# Patient Record
Sex: FEMALE | URBAN - NONMETROPOLITAN AREA
[De-identification: names, ages, dates, MRNs, and addresses within clinical notes are randomized per-mention and may not be internally consistent; named-entity substitution may affect disease eponyms.]

---

## 2020-06-18 ENCOUNTER — TELEPHONE ENCOUNTER (OUTPATIENT)
Dept: URBAN - NONMETROPOLITAN AREA CLINIC 2 | Facility: CLINIC | Age: 45
End: 2020-06-18

## 2020-06-18 RX ORDER — METOPROLOL TARTRATE 25 MG/1
TABLET, FILM COATED ORAL
Qty: 0 | Refills: 0 | Status: ACTIVE | COMMUNITY
Start: 1900-01-01 | End: 1900-01-01

## 2020-06-18 RX ORDER — COLESTIPOL HYDROCHLORIDE 1 G/1
TAKE 1 TABLET BY ORAL ROUTE DAILY TABLET ORAL 1
Qty: 90 | Refills: 3 | Status: ACTIVE | COMMUNITY
Start: 2020-02-24 | End: 2021-02-18

## 2020-06-18 RX ORDER — MONTELUKAST SODIUM 10 MG/1
TAKE 1 TABLET (10 MG) BY ORAL ROUTE ONCE DAILY IN THE EVENING TABLET, FILM COATED ORAL 1
Qty: 0 | Refills: 0 | Status: ACTIVE | COMMUNITY
Start: 1900-01-01 | End: 1900-01-01

## 2020-06-18 RX ORDER — ASPIRIN 325 MG/1
TABLET ORAL
Qty: 0 | Refills: 0 | Status: ACTIVE | COMMUNITY
Start: 1900-01-01 | End: 1900-01-01

## 2020-06-18 RX ORDER — HYOSCYAMINE SULFATE 0.12 MG/1
1 TABLET UNDER THE TONGUE AND ALLOW TO DISSOLVE  AS NEEDED TABLET SUBLINGUAL
Qty: 60 TABLET | Refills: 6 | OUTPATIENT
Start: 2020-06-18 | End: 2021-01-13

## 2020-06-18 RX ORDER — CETIRIZINE HYDROCHLORIDE 10 MG/1
CAPSULE, LIQUID FILLED ORAL
Qty: 0 | Refills: 0 | Status: ACTIVE | COMMUNITY
Start: 1900-01-01 | End: 1900-01-01

## 2020-06-18 RX ORDER — FAMOTIDINE 20 MG/1
1 TABLET AT BEDTIME AS NEEDED TABLET, FILM COATED ORAL TWICE A DAY
Qty: 180 TABLET | Refills: 3 | OUTPATIENT
Start: 2020-06-18

## 2020-06-18 RX ORDER — LEVOTHYROXINE SODIUM 50 UG/1
TABLET ORAL
Qty: 0 | Refills: 0 | Status: ACTIVE | COMMUNITY
Start: 1900-01-01 | End: 1900-01-01

## 2020-11-12 ENCOUNTER — OFFICE VISIT (OUTPATIENT)
Dept: URBAN - METROPOLITAN AREA TELEHEALTH 2 | Facility: TELEHEALTH | Age: 45
End: 2020-11-12
Payer: COMMERCIAL

## 2020-11-12 DIAGNOSIS — K58.0 IRRITABLE BOWEL SYNDROME WITH DIARRHEA: ICD-10-CM

## 2020-11-12 DIAGNOSIS — Z12.11 COLON CANCER SCREENING: ICD-10-CM

## 2020-11-12 DIAGNOSIS — A04.72 C. DIFFICILE DIARRHEA: ICD-10-CM

## 2020-11-12 PROCEDURE — 99213 OFFICE O/P EST LOW 20 MIN: CPT | Performed by: INTERNAL MEDICINE

## 2020-11-12 PROCEDURE — G8427 DOCREV CUR MEDS BY ELIG CLIN: HCPCS | Performed by: INTERNAL MEDICINE

## 2020-11-12 PROCEDURE — 1036F TOBACCO NON-USER: CPT | Performed by: INTERNAL MEDICINE

## 2020-11-12 PROCEDURE — G8417 CALC BMI ABV UP PARAM F/U: HCPCS | Performed by: INTERNAL MEDICINE

## 2020-11-12 PROCEDURE — G9903 PT SCRN TBCO ID AS NON USER: HCPCS | Performed by: INTERNAL MEDICINE

## 2020-11-12 RX ORDER — ASPIRIN 325 MG/1
TABLET ORAL
Qty: 0 | Refills: 0 | Status: ACTIVE | COMMUNITY
Start: 1900-01-01

## 2020-11-12 RX ORDER — FAMOTIDINE 20 MG/1
1 TABLET AT BEDTIME AS NEEDED TABLET, FILM COATED ORAL TWICE A DAY
Qty: 180 TABLET | Refills: 3 | Status: ACTIVE | COMMUNITY
Start: 2020-06-18

## 2020-11-12 RX ORDER — MONTELUKAST SODIUM 10 MG/1
TAKE 1 TABLET (10 MG) BY ORAL ROUTE ONCE DAILY IN THE EVENING TABLET, FILM COATED ORAL 1
Qty: 0 | Refills: 0 | Status: ACTIVE | COMMUNITY
Start: 1900-01-01

## 2020-11-12 RX ORDER — LEVOTHYROXINE SODIUM 50 UG/1
TABLET ORAL
Qty: 0 | Refills: 0 | Status: ACTIVE | COMMUNITY
Start: 1900-01-01

## 2020-11-12 RX ORDER — HYOSCYAMINE SULFATE 0.12 MG/1
1 TABLET UNDER THE TONGUE AND ALLOW TO DISSOLVE  AS NEEDED TABLET SUBLINGUAL
Qty: 60 TABLET | Refills: 6 | Status: ACTIVE | COMMUNITY
Start: 2020-06-18 | End: 2021-01-13

## 2020-11-12 RX ORDER — CETIRIZINE HYDROCHLORIDE 10 MG/1
CAPSULE, LIQUID FILLED ORAL
Qty: 0 | Refills: 0 | Status: ACTIVE | COMMUNITY
Start: 1900-01-01

## 2020-11-12 RX ORDER — COLESTIPOL HYDROCHLORIDE 1 G/1
TAKE 1 TABLET BY ORAL ROUTE DAILY TABLET ORAL 1
Qty: 90 | Refills: 3 | Status: ACTIVE | COMMUNITY
Start: 2020-02-24 | End: 2021-02-18

## 2020-11-12 RX ORDER — METOPROLOL TARTRATE 25 MG/1
TABLET, FILM COATED ORAL
Qty: 0 | Refills: 0 | Status: ACTIVE | COMMUNITY
Start: 1900-01-01

## 2020-11-12 NOTE — HPI-TODAY'S VISIT:
2/24/2020 Suzanne presents for evaluation of diarrhea and c. diff. She developed diarrhea new years day. Saw her PCP that week with stools that were negative and treated with cipro x 7 days. She developed recurrent diarrhea after her cipro treatment and tested positive for c.diff. She completed 2 weeks of vanc. She is still having loose/urgent stools 2-3 times a day but no mucous or foul smell. She is on florastor daily. She denies fever or chills. 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 Since her last visit her labs are normal. Her colonoscopy was normal. She states the colestipol helps significantly but causes constipation daily. She does find it hard to take as needed with the medication interaction. She would like to have someting on hand for bad days. SHe notices dairy is a big trigger to her diarrhea. Today she is doing well otherwise. MB  11/12/2020 Suzanne presents for follow up of IBS D. She is doing great on florastor BID and levsin or immodium as needed for triggers. She thinks the colestipol TIW seems to help. She is doing well otherwise. MB

## 2021-04-14 ENCOUNTER — ERX REFILL RESPONSE (OUTPATIENT)
Dept: URBAN - NONMETROPOLITAN AREA CLINIC 2 | Facility: CLINIC | Age: 46
End: 2021-04-14

## 2021-04-14 RX ORDER — FAMOTIDINE 20 MG/1
1 TABLET AT BEDTIME AS NEEDED TABLET ORAL TWICE A DAY
Qty: 180 | Refills: 3

## 2021-08-13 NOTE — PHYSICAL EXAM GASTROINTESTINAL
Abdomen Self Exam, soft, nontender, nondistended , no masses palpable FAMILY HISTORY:  Family history of heart attack    Sibling  Still living? Unknown  Family history of cancer, Age at diagnosis: Age Unknown  Family history of systemic lupus erythematosus, Age at diagnosis: Age Unknown

## 2022-12-26 ENCOUNTER — OFFICE VISIT (OUTPATIENT)
Dept: URBAN - NONMETROPOLITAN AREA CLINIC 2 | Facility: CLINIC | Age: 47
End: 2022-12-26

## 2023-01-04 ENCOUNTER — WEB ENCOUNTER (OUTPATIENT)
Dept: URBAN - NONMETROPOLITAN AREA CLINIC 2 | Facility: CLINIC | Age: 48
End: 2023-01-04

## 2023-01-06 ENCOUNTER — OFFICE VISIT (OUTPATIENT)
Dept: URBAN - NONMETROPOLITAN AREA CLINIC 2 | Facility: CLINIC | Age: 48
End: 2023-01-06
Payer: COMMERCIAL

## 2023-01-06 ENCOUNTER — LAB OUTSIDE AN ENCOUNTER (OUTPATIENT)
Dept: URBAN - NONMETROPOLITAN AREA CLINIC 2 | Facility: CLINIC | Age: 48
End: 2023-01-06

## 2023-01-06 VITALS
DIASTOLIC BLOOD PRESSURE: 86 MMHG | SYSTOLIC BLOOD PRESSURE: 133 MMHG | BODY MASS INDEX: 41.15 KG/M2 | WEIGHT: 247 LBS | HEIGHT: 65 IN | HEART RATE: 60 BPM

## 2023-01-06 DIAGNOSIS — A04.72 C. DIFFICILE DIARRHEA: ICD-10-CM

## 2023-01-06 DIAGNOSIS — K58.0 IRRITABLE BOWEL SYNDROME WITH DIARRHEA: ICD-10-CM

## 2023-01-06 DIAGNOSIS — K21.9 GASTROESOPHAGEAL REFLUX DISEASE, UNSPECIFIED WHETHER ESOPHAGITIS PRESENT: ICD-10-CM

## 2023-01-06 DIAGNOSIS — Z12.11 COLON CANCER SCREENING: ICD-10-CM

## 2023-01-06 PROBLEM — 275978004 COLON CANCER SCREENING: Status: ACTIVE | Noted: 2020-11-12

## 2023-01-06 PROCEDURE — 99214 OFFICE O/P EST MOD 30 MIN: CPT | Performed by: NURSE PRACTITIONER

## 2023-01-06 RX ORDER — LEVOTHYROXINE SODIUM 50 UG/1
TABLET ORAL
Qty: 0 | Refills: 0 | Status: ACTIVE | COMMUNITY
Start: 1900-01-01

## 2023-01-06 RX ORDER — METOPROLOL TARTRATE 25 MG/1
TABLET, FILM COATED ORAL
Qty: 0 | Refills: 0 | Status: ACTIVE | COMMUNITY
Start: 1900-01-01

## 2023-01-06 RX ORDER — ASPIRIN 325 MG/1
TABLET ORAL
Qty: 0 | Refills: 0 | Status: ACTIVE | COMMUNITY
Start: 1900-01-01

## 2023-01-06 RX ORDER — CETIRIZINE HYDROCHLORIDE 10 MG/1
CAPSULE, LIQUID FILLED ORAL
Qty: 0 | Refills: 0 | Status: ACTIVE | COMMUNITY
Start: 1900-01-01

## 2023-01-06 RX ORDER — OMEPRAZOLE 40 MG/1
1 CAPSULE 30 MINUTES BEFORE MORNING MEAL CAPSULE, DELAYED RELEASE ORAL ONCE A DAY
Status: ACTIVE | COMMUNITY

## 2023-01-06 RX ORDER — MONTELUKAST SODIUM 10 MG/1
TAKE 1 TABLET (10 MG) BY ORAL ROUTE ONCE DAILY IN THE EVENING TABLET, FILM COATED ORAL 1
Qty: 0 | Refills: 0 | Status: ACTIVE | COMMUNITY
Start: 1900-01-01

## 2023-01-06 RX ORDER — LISINOPRIL 10 MG/1
1 TABLET TABLET ORAL ONCE A DAY
Status: ACTIVE | COMMUNITY

## 2023-01-06 NOTE — HPI-TODAY'S VISIT:
2/24/2020 Suzanne presents for evaluation of diarrhea and c. diff. She developed diarrhea new years day. Saw her PCP that week with stools that were negative and treated with cipro x 7 days. She developed recurrent diarrhea after her cipro treatment and tested positive for c.diff. She completed 2 weeks of vanc. She is still having loose/urgent stools 2-3 times a day but no mucous or foul smell. She is on florastor daily. She denies fever or chills. 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 Since her last visit her labs are normal. Her colonoscopy was normal. She states the colestipol helps significantly but causes constipation daily. She does find it hard to take as needed with the medication interaction. She would like to have someting on hand for bad days. SHe notices dairy is a big trigger to her diarrhea. Today she is doing well otherwise. MB  11/12/2020 Suzanne presents for follow up of IBS D. She is doing great on florastor BID and levsin or immodium as needed for triggers. She thinks the colestipol TIW seems to help. She is doing well otherwise. MB 1/6/2023 Suzanne presents for evaluation of reflux.  Earlier this year she developed a flare, she was placed on Pepcid 20 mg twice daily with no relief, Dr. Stern then added omeprazole 40 mg daily and this helped for 4 weeks and then she had a flare, he added famotidine at night with improvement.  Her reflux has been better controlled however she has been unable to wean off.  She is on Celebrex and does take a regular strength aspirin.  She has never had an upper endoscopy in the past.  Today she agrees to pursue endoscopic evaluation of her symptoms, we have also discussed nutritional changes related to reflux along with weight loss.  We will follow-up pending endoscopy results.  MB

## 2023-04-03 ENCOUNTER — OFFICE VISIT (OUTPATIENT)
Dept: URBAN - NONMETROPOLITAN AREA SURGERY CENTER 1 | Facility: SURGERY CENTER | Age: 48
End: 2023-04-03

## 2023-04-06 ENCOUNTER — TELEPHONE ENCOUNTER (OUTPATIENT)
Dept: URBAN - NONMETROPOLITAN AREA CLINIC 2 | Facility: CLINIC | Age: 48
End: 2023-04-06

## 2023-04-24 ENCOUNTER — OFFICE VISIT (OUTPATIENT)
Dept: URBAN - NONMETROPOLITAN AREA SURGERY CENTER 1 | Facility: SURGERY CENTER | Age: 48
End: 2023-04-24

## 2023-06-28 ENCOUNTER — CLAIMS CREATED FROM THE CLAIM WINDOW (OUTPATIENT)
Dept: URBAN - METROPOLITAN AREA CLINIC 4 | Facility: CLINIC | Age: 48
End: 2023-06-28
Payer: COMMERCIAL

## 2023-06-28 ENCOUNTER — OFFICE VISIT (OUTPATIENT)
Dept: URBAN - NONMETROPOLITAN AREA SURGERY CENTER 1 | Facility: SURGERY CENTER | Age: 48
End: 2023-06-28
Payer: COMMERCIAL

## 2023-06-28 DIAGNOSIS — K29.70 GASTRITIS, UNSPECIFIED, WITHOUT BLEEDING: ICD-10-CM

## 2023-06-28 DIAGNOSIS — K31.89 OTHER DISEASES OF STOMACH AND DUODENUM: ICD-10-CM

## 2023-06-28 DIAGNOSIS — K29.60 ADENOPAPILLOMATOSIS GASTRICA: ICD-10-CM

## 2023-06-28 PROCEDURE — 43239 EGD BIOPSY SINGLE/MULTIPLE: CPT | Performed by: INTERNAL MEDICINE

## 2023-06-28 PROCEDURE — G8907 PT DOC NO EVENTS ON DISCHARG: HCPCS | Performed by: INTERNAL MEDICINE

## 2023-06-28 PROCEDURE — 88312 SPECIAL STAINS GROUP 1: CPT | Performed by: PATHOLOGY

## 2023-06-28 PROCEDURE — 88305 TISSUE EXAM BY PATHOLOGIST: CPT | Performed by: PATHOLOGY

## 2023-07-25 ENCOUNTER — OFFICE VISIT (OUTPATIENT)
Dept: URBAN - NONMETROPOLITAN AREA CLINIC 2 | Facility: CLINIC | Age: 48
End: 2023-07-25

## 2023-07-27 ENCOUNTER — OFFICE VISIT (OUTPATIENT)
Dept: URBAN - NONMETROPOLITAN AREA CLINIC 2 | Facility: CLINIC | Age: 48
End: 2023-07-27
Payer: COMMERCIAL

## 2023-07-27 DIAGNOSIS — A04.72 C. DIFFICILE DIARRHEA: ICD-10-CM

## 2023-07-27 DIAGNOSIS — Z12.11 COLON CANCER SCREENING: ICD-10-CM

## 2023-07-27 DIAGNOSIS — K58.0 IRRITABLE BOWEL SYNDROME WITH DIARRHEA: ICD-10-CM

## 2023-07-27 DIAGNOSIS — K21.9 GASTROESOPHAGEAL REFLUX DISEASE, UNSPECIFIED WHETHER ESOPHAGITIS PRESENT: ICD-10-CM

## 2023-07-27 PROCEDURE — 99212 OFFICE O/P EST SF 10 MIN: CPT

## 2023-07-27 RX ORDER — METOPROLOL TARTRATE 25 MG/1
TABLET, FILM COATED ORAL
Qty: 0 | Refills: 0 | Status: ACTIVE | COMMUNITY
Start: 1900-01-01

## 2023-07-27 RX ORDER — HYOSCYAMINE SULFATE 0.12 MG/1
1 TABLET UNDER THE TONGUE AND ALLOW TO DISSOLVE  AS NEEDED TABLET SUBLINGUAL
Qty: 60 TABLET | Refills: 6
Start: 2020-06-18 | End: 2024-02-22

## 2023-07-27 RX ORDER — LEVOTHYROXINE SODIUM 50 UG/1
TABLET ORAL
Qty: 0 | Refills: 0 | Status: ACTIVE | COMMUNITY
Start: 1900-01-01

## 2023-07-27 RX ORDER — ASPIRIN 325 MG/1
TABLET ORAL
Qty: 0 | Refills: 0 | Status: ACTIVE | COMMUNITY
Start: 1900-01-01

## 2023-07-27 RX ORDER — CETIRIZINE HYDROCHLORIDE 10 MG/1
CAPSULE, LIQUID FILLED ORAL
Qty: 0 | Refills: 0 | Status: ACTIVE | COMMUNITY
Start: 1900-01-01

## 2023-07-27 RX ORDER — LISINOPRIL 10 MG/1
1 TABLET TABLET ORAL ONCE A DAY
Status: ACTIVE | COMMUNITY

## 2023-07-27 RX ORDER — OMEPRAZOLE 40 MG/1
1 CAPSULE 30 MINUTES BEFORE MORNING MEAL CAPSULE, DELAYED RELEASE ORAL ONCE A DAY
Status: ACTIVE | COMMUNITY

## 2023-07-27 RX ORDER — OMEPRAZOLE 40 MG/1
1 CAPSULE 30 MINUTES BEFORE MORNING MEAL CAPSULE, DELAYED RELEASE ORAL ONCE A DAY
Qty: 90 | Refills: 3

## 2023-07-27 RX ORDER — MONTELUKAST SODIUM 10 MG/1
TAKE 1 TABLET (10 MG) BY ORAL ROUTE ONCE DAILY IN THE EVENING TABLET, FILM COATED ORAL 1
Qty: 0 | Refills: 0 | Status: ACTIVE | COMMUNITY
Start: 1900-01-01

## 2023-07-27 NOTE — HPI-TODAY'S VISIT:
2/24/2020 Suzanne presents for evaluation of diarrhea and c. diff. She developed diarrhea new years day. Saw her PCP that week with stools that were negative and treated with cipro x 7 days. She developed recurrent diarrhea after her cipro treatment and tested positive for c.diff. She completed 2 weeks of vanc. She is still having loose/urgent stools 2-3 times a day but no mucous or foul smell. She is on florastor daily. She denies fever or chills. 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 Since her last visit her labs are normal. Her colonoscopy was normal. She states the colestipol helps significantly but causes constipation daily. She does find it hard to take as needed with the medication interaction. She would like to have someting on hand for bad days. SHe notices dairy is a big trigger to her diarrhea. Today she is doing well otherwise. MB  11/12/2020 Suzanne presents for follow up of IBS D. She is doing great on florastor BID and levsin or immodium as needed for triggers. She thinks the colestipol TIW seems to help. She is doing well otherwise. MB 1/6/2023 Suzanne presents for evaluation of reflux.  Earlier this year she developed a flare, she was placed on Pepcid 20 mg twice daily with no relief, Dr. Stern then added omeprazole 40 mg daily and this helped for 4 weeks and then she had a flare, he added famotidine at night with improvement.  Her reflux has been better controlled however she has been unable to wean off.  She is on Celebrex and does take a regular strength aspirin.  She has never had an upper endoscopy in the past.  Today she agrees to pursue endoscopic evaluation of her symptoms, we have also discussed nutritional changes related to reflux along with weight loss.  We will follow-up pending endoscopy results.  MB 7/27/2023 Alyson returns to clinic after her EGD with Dr. Katz, pathology revealed chronic gastritis only.  She states the current cocktail arranged by Sherrie has left her with the best symptom coverage she is ever felt.  She continues on omeprazole 40 mg daily in the morning with Pepcid 20 mg before bedtime.  She only has occasional breakthrough.  She is working on dietary management.  She is down to 1 cup of coffee and 1 Diet Coke daily.  She is sleeping with pillows to elevate her head.  We talked about fully elevating the head of bed to allow for full upper body incline.  We discussed weight loss.  She will return in 4 months to review attempts at weaning versus possible reflux monitor with Sherrie. No C.Diff flares, continues florastor BID.  She stopped colestipol, continues with Levsin for flares which she finds a "live saver" . Trigger food is likely dairy- cheese and pizza. She starts back to teaching next week, has a small weekend trip to [place] with her daughter who just recently had spinal fusion for scoliosis. KRISTIN

## 2024-01-08 ENCOUNTER — DASHBOARD ENCOUNTERS (OUTPATIENT)
Age: 49
End: 2024-01-08

## 2024-01-08 ENCOUNTER — OFFICE VISIT (OUTPATIENT)
Dept: URBAN - NONMETROPOLITAN AREA CLINIC 2 | Facility: CLINIC | Age: 49
End: 2024-01-08
Payer: COMMERCIAL

## 2024-01-08 VITALS
WEIGHT: 235 LBS | HEART RATE: 67 BPM | BODY MASS INDEX: 39.15 KG/M2 | DIASTOLIC BLOOD PRESSURE: 81 MMHG | SYSTOLIC BLOOD PRESSURE: 159 MMHG | TEMPERATURE: 98.6 F | HEIGHT: 65 IN

## 2024-01-08 DIAGNOSIS — K58.0 IRRITABLE BOWEL SYNDROME WITH DIARRHEA: ICD-10-CM

## 2024-01-08 DIAGNOSIS — K21.9 GASTROESOPHAGEAL REFLUX DISEASE, UNSPECIFIED WHETHER ESOPHAGITIS PRESENT: ICD-10-CM

## 2024-01-08 DIAGNOSIS — Z12.11 COLON CANCER SCREENING: ICD-10-CM

## 2024-01-08 DIAGNOSIS — A04.72 C. DIFFICILE DIARRHEA: ICD-10-CM

## 2024-01-08 PROBLEM — 197125005: Status: ACTIVE | Noted: 2020-06-18

## 2024-01-08 PROBLEM — 235595009: Status: ACTIVE | Noted: 2023-01-06

## 2024-01-08 PROCEDURE — 99214 OFFICE O/P EST MOD 30 MIN: CPT | Performed by: NURSE PRACTITIONER

## 2024-01-08 RX ORDER — ASPIRIN 325 MG/1
TABLET ORAL
Qty: 0 | Refills: 0 | Status: ACTIVE | COMMUNITY
Start: 1900-01-01

## 2024-01-08 RX ORDER — MONTELUKAST SODIUM 10 MG/1
TAKE 1 TABLET (10 MG) BY ORAL ROUTE ONCE DAILY IN THE EVENING TABLET, FILM COATED ORAL 1
Qty: 0 | Refills: 0 | Status: ACTIVE | COMMUNITY
Start: 1900-01-01

## 2024-01-08 RX ORDER — METOPROLOL TARTRATE 25 MG/1
TABLET, FILM COATED ORAL
Qty: 0 | Refills: 0 | Status: ACTIVE | COMMUNITY
Start: 1900-01-01

## 2024-01-08 RX ORDER — HYOSCYAMINE SULFATE 0.12 MG/1
1 TABLET UNDER THE TONGUE AND ALLOW TO DISSOLVE AS NEEDED TABLET SUBLINGUAL
Qty: 60 TABLETS | Refills: 6
Start: 2020-06-18 | End: 2024-08-05

## 2024-01-08 RX ORDER — LISINOPRIL 10 MG/1
1 TABLET TABLET ORAL ONCE A DAY
Status: ACTIVE | COMMUNITY

## 2024-01-08 RX ORDER — CETIRIZINE HYDROCHLORIDE 10 MG/1
CAPSULE, LIQUID FILLED ORAL
Qty: 0 | Refills: 0 | Status: ACTIVE | COMMUNITY
Start: 1900-01-01

## 2024-01-08 RX ORDER — HYOSCYAMINE SULFATE 0.12 MG/1
1 TABLET UNDER THE TONGUE AND ALLOW TO DISSOLVE  AS NEEDED TABLET SUBLINGUAL
Qty: 60 TABLET | Refills: 6 | Status: ACTIVE | COMMUNITY
Start: 2020-06-18 | End: 2024-02-22

## 2024-01-08 RX ORDER — OMEPRAZOLE 40 MG/1
1 CAPSULE 30 MINUTES BEFORE MORNING MEAL CAPSULE, DELAYED RELEASE ORAL ONCE A DAY
Qty: 90 | Refills: 3 | Status: ACTIVE | COMMUNITY

## 2024-01-08 RX ORDER — FAMOTIDINE 40 MG/1
1 TABLET AT BEDTIME TABLET, FILM COATED ORAL ONCE A DAY
Qty: 90 TABLET | Refills: 3 | OUTPATIENT
Start: 2024-01-08

## 2024-01-08 RX ORDER — LEVOTHYROXINE SODIUM 50 UG/1
TABLET ORAL
Qty: 0 | Refills: 0 | Status: ACTIVE | COMMUNITY
Start: 1900-01-01

## 2024-01-08 RX ORDER — OMEPRAZOLE 20 MG/1
1 CAPSULE CAPSULE, DELAYED RELEASE ORAL TWICE DAILY
Qty: 180 CAPSULE | Refills: 3

## 2024-01-08 NOTE — HPI-TODAY'S VISIT:
2/24/2020 Suzanne presents for evaluation of diarrhea and c. diff. She developed diarrhea new years day. Saw her PCP that week with stools that were negative and treated with cipro x 7 days. She developed recurrent diarrhea after her cipro treatment and tested positive for c.diff. She completed 2 weeks of vanc. She is still having loose/urgent stools 2-3 times a day but no mucous or foul smell. She is on florastor daily. She denies fever or chills. 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 Since her last visit her labs are normal. Her colonoscopy was normal. She states the colestipol helps significantly but causes constipation daily. She does find it hard to take as needed with the medication interaction. She would like to have someting on hand for bad days. SHe notices dairy is a big trigger to her diarrhea. Today she is doing well otherwise. MB  11/12/2020 Suzanne presents for follow up of IBS D. She is doing great on florastor BID and levsin or immodium as needed for triggers. She thinks the colestipol TIW seems to help. She is doing well otherwise. MB 1/6/2023 Suzanne presents for evaluation of reflux.  Earlier this year she developed a flare, she was placed on Pepcid 20 mg twice daily with no relief, Dr. Stern then added omeprazole 40 mg daily and this helped for 4 weeks and then she had a flare, he added famotidine at night with improvement.  Her reflux has been better controlled however she has been unable to wean off.  She is on Celebrex and does take a regular strength aspirin.  She has never had an upper endoscopy in the past.  Today she agrees to pursue endoscopic evaluation of her symptoms, we have also discussed nutritional changes related to reflux along with weight loss.  We will follow-up pending endoscopy results.  MB 7/27/2023 Alyson returns to clinic after her EGD with Dr. Katz, pathology revealed chronic gastritis only.  She states the current cocktail arranged by Sherrie has left her with the best symptom coverage she is ever felt.  She continues on omeprazole 40 mg daily in the morning with Pepcid 20 mg before bedtime.  She only has occasional breakthrough.  She is working on dietary management.  She is down to 1 cup of coffee and 1 Diet Coke daily.  She is sleeping with pillows to elevate her head.  We talked about fully elevating the head of bed to allow for full upper body incline.  We discussed weight loss.  She will return in 4 months to review attempts at weaning versus possible reflux monitor with Sherrie. No C.Diff flares, continues florastor BID.  She stopped colestipol, continues with Levsin for flares which she finds a "live saver" . Trigger food is likely dairy- cheese and pizza. She starts back to teaching next week, has a small weekend trip to [place] with her daughter who just recently had spinal fusion for scoliosis. SP 1/8/2024 Alyson presents for follow-up of reflux.  Since her last visit she is doing well on omeprazole 40 mg in the morning and famotidine in the evening.  She agrees to wean her PPI.  Her Levsin as needed helps.  Today she denies any new GI complaints.  MB

## 2024-07-08 ENCOUNTER — OFFICE VISIT (OUTPATIENT)
Dept: URBAN - NONMETROPOLITAN AREA CLINIC 2 | Facility: CLINIC | Age: 49
End: 2024-07-08
Payer: COMMERCIAL

## 2024-07-08 VITALS
WEIGHT: 240 LBS | DIASTOLIC BLOOD PRESSURE: 84 MMHG | HEART RATE: 59 BPM | BODY MASS INDEX: 39.99 KG/M2 | SYSTOLIC BLOOD PRESSURE: 143 MMHG | HEIGHT: 65 IN

## 2024-07-08 DIAGNOSIS — K58.0 IRRITABLE BOWEL SYNDROME WITH DIARRHEA: ICD-10-CM

## 2024-07-08 DIAGNOSIS — K21.9 GASTROESOPHAGEAL REFLUX DISEASE, UNSPECIFIED WHETHER ESOPHAGITIS PRESENT: ICD-10-CM

## 2024-07-08 PROCEDURE — 99214 OFFICE O/P EST MOD 30 MIN: CPT | Performed by: NURSE PRACTITIONER

## 2024-07-08 RX ORDER — FAMOTIDINE 40 MG/1
1 TABLET AT BEDTIME TABLET, FILM COATED ORAL ONCE A DAY
Qty: 90 TABLET | Refills: 3 | Status: ACTIVE | COMMUNITY
Start: 2024-01-08

## 2024-07-08 RX ORDER — OMEPRAZOLE 20 MG/1
1 CAPSULE CAPSULE, DELAYED RELEASE ORAL TWICE DAILY
Qty: 180 CAPSULE | Refills: 3

## 2024-07-08 RX ORDER — METOPROLOL TARTRATE 25 MG/1
TABLET, FILM COATED ORAL
Qty: 0 | Refills: 0 | Status: ACTIVE | COMMUNITY
Start: 1900-01-01

## 2024-07-08 RX ORDER — HYOSCYAMINE SULFATE 0.12 MG/1
1 TABLET UNDER THE TONGUE AND ALLOW TO DISSOLVE AS NEEDED TABLET SUBLINGUAL
Qty: 60 TABLETS | Refills: 6 | Status: ACTIVE | COMMUNITY
Start: 2020-06-18 | End: 2024-08-05

## 2024-07-08 RX ORDER — OMEPRAZOLE 20 MG/1
1 CAPSULE CAPSULE, DELAYED RELEASE ORAL TWICE DAILY
Qty: 180 CAPSULE | Refills: 3 | Status: ACTIVE | COMMUNITY

## 2024-07-08 RX ORDER — LISINOPRIL 10 MG/1
1 TABLET TABLET ORAL ONCE A DAY
Status: ACTIVE | COMMUNITY

## 2024-07-08 RX ORDER — HYOSCYAMINE SULFATE 0.12 MG/1
1 TABLET UNDER THE TONGUE AND ALLOW TO DISSOLVE AS NEEDED TABLET SUBLINGUAL
Qty: 180 | Refills: 3
Start: 2020-06-18 | End: 2025-07-03

## 2024-07-08 RX ORDER — LEVOTHYROXINE SODIUM 50 UG/1
TABLET ORAL
Qty: 0 | Refills: 0 | Status: ACTIVE | COMMUNITY
Start: 1900-01-01

## 2024-07-08 RX ORDER — CETIRIZINE HYDROCHLORIDE 10 MG/1
CAPSULE, LIQUID FILLED ORAL
Qty: 0 | Refills: 0 | Status: ACTIVE | COMMUNITY
Start: 1900-01-01

## 2024-07-08 RX ORDER — MONTELUKAST SODIUM 10 MG/1
TAKE 1 TABLET (10 MG) BY ORAL ROUTE ONCE DAILY IN THE EVENING TABLET, FILM COATED ORAL 1
Qty: 0 | Refills: 0 | Status: ACTIVE | COMMUNITY
Start: 1900-01-01

## 2024-07-08 RX ORDER — FAMOTIDINE 40 MG/1
1 TABLET AT BEDTIME TABLET, FILM COATED ORAL ONCE A DAY
Qty: 90 TABLET | Refills: 3
Start: 2024-01-08

## 2024-07-08 RX ORDER — ASPIRIN 325 MG/1
TABLET ORAL
Qty: 0 | Refills: 0 | Status: ACTIVE | COMMUNITY
Start: 1900-01-01

## 2024-07-08 NOTE — HPI-TODAY'S VISIT:
2/24/2020 Suzanne presents for evaluation of diarrhea and c. diff. She developed diarrhea new years day. Saw her PCP that week with stools that were negative and treated with cipro x 7 days. She developed recurrent diarrhea after her cipro treatment and tested positive for c.diff. She completed 2 weeks of vanc. She is still having loose/urgent stools 2-3 times a day but no mucous or foul smell. She is on florastor daily. She denies fever or chills. 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 Since her last visit her labs are normal. Her colonoscopy was normal. She states the colestipol helps significantly but causes constipation daily. She does find it hard to take as needed with the medication interaction. She would like to have someting on hand for bad days. SHe notices dairy is a big trigger to her diarrhea. Today she is doing well otherwise. MB  11/12/2020 Suzanne presents for follow up of IBS D. She is doing great on florastor BID and levsin or immodium as needed for triggers. She thinks the colestipol TIW seems to help. She is doing well otherwise. MB 1/6/2023 Suzanne presents for evaluation of reflux.  Earlier this year she developed a flare, she was placed on Pepcid 20 mg twice daily with no relief, Dr. Stern then added omeprazole 40 mg daily and this helped for 4 weeks and then she had a flare, he added famotidine at night with improvement.  Her reflux has been better controlled however she has been unable to wean off.  She is on Celebrex and does take a regular strength aspirin.  She has never had an upper endoscopy in the past.  Today she agrees to pursue endoscopic evaluation of her symptoms, we have also discussed nutritional changes related to reflux along with weight loss.  We will follow-up pending endoscopy results.  MB 7/27/2023 Alyson returns to clinic after her EGD with Dr. Katz, pathology revealed chronic gastritis only.  She states the current cocktail arranged by Sherrie has left her with the best symptom coverage she is ever felt.  She continues on omeprazole 40 mg daily in the morning with Pepcid 20 mg before bedtime.  She only has occasional breakthrough.  She is working on dietary management.  She is down to 1 cup of coffee and 1 Diet Coke daily.  She is sleeping with pillows to elevate her head.  We talked about fully elevating the head of bed to allow for full upper body incline.  We discussed weight loss.  She will return in 4 months to review attempts at weaning versus possible reflux monitor with Sherrie. No C.Diff flares, continues florastor BID.  She stopped colestipol, continues with Levsin for flares which she finds a "live saver" . Trigger food is likely dairy- cheese and pizza. She starts back to teaching next week, has a small weekend trip to [place] with her daughter who just recently had spinal fusion for scoliosis. KRISTIN 1/8/2024 Alyson presents for follow-up of reflux.  Since her last visit she is doing well on omeprazole 40 mg in the morning and famotidine in the evening.  She agrees to wean her PPI.  Her Levsin as needed helps.  Today she denies any new GI complaints.  MB 7/8/2024 Alyson presents for follow-up.  She is status post knee replacement with constipation.  Today we have discussed the bowel regimen.  Her reflux is much improved on omeprazole 20 the morning and famotidine in the evening.  She is having take NSAIDs postop but tolerating these well.  MB

## 2025-01-20 ENCOUNTER — OFFICE VISIT (OUTPATIENT)
Dept: URBAN - NONMETROPOLITAN AREA CLINIC 2 | Facility: CLINIC | Age: 50
End: 2025-01-20

## 2025-01-27 ENCOUNTER — OFFICE VISIT (OUTPATIENT)
Dept: URBAN - NONMETROPOLITAN AREA CLINIC 2 | Facility: CLINIC | Age: 50
End: 2025-01-27
Payer: COMMERCIAL

## 2025-01-27 VITALS
OXYGEN SATURATION: 99 % | SYSTOLIC BLOOD PRESSURE: 150 MMHG | HEIGHT: 65 IN | WEIGHT: 251 LBS | BODY MASS INDEX: 41.82 KG/M2 | HEART RATE: 49 BPM | DIASTOLIC BLOOD PRESSURE: 88 MMHG

## 2025-01-27 DIAGNOSIS — K21.9 GASTROESOPHAGEAL REFLUX DISEASE, UNSPECIFIED WHETHER ESOPHAGITIS PRESENT: ICD-10-CM

## 2025-01-27 DIAGNOSIS — K58.0 IRRITABLE BOWEL SYNDROME WITH DIARRHEA: ICD-10-CM

## 2025-01-27 DIAGNOSIS — Z12.11 COLON CANCER SCREENING: ICD-10-CM

## 2025-01-27 DIAGNOSIS — A04.72 C. DIFFICILE DIARRHEA: ICD-10-CM

## 2025-01-27 PROCEDURE — 99214 OFFICE O/P EST MOD 30 MIN: CPT | Performed by: NURSE PRACTITIONER

## 2025-01-27 RX ORDER — ASPIRIN 81 MG/1
TABLET, COATED ORAL
Qty: 60 TABLET | Status: ACTIVE | COMMUNITY

## 2025-01-27 RX ORDER — METOPROLOL SUCCINATE 25 MG/1
TABLET, EXTENDED RELEASE ORAL
Qty: 90 TABLET | Status: ACTIVE | COMMUNITY

## 2025-01-27 RX ORDER — LEVOTHYROXINE SODIUM 50 UG/1
TABLET ORAL
Qty: 0 | Refills: 0 | Status: ACTIVE | COMMUNITY
Start: 1900-01-01

## 2025-01-27 RX ORDER — LISINOPRIL 20 MG/1
1 TABLET TABLET ORAL ONCE A DAY
Qty: 30 | Status: ACTIVE | COMMUNITY
Start: 2025-01-27

## 2025-01-27 RX ORDER — CETIRIZINE HYDROCHLORIDE 10 MG/1
CAPSULE, LIQUID FILLED ORAL
Qty: 0 | Refills: 0 | Status: ACTIVE | COMMUNITY
Start: 1900-01-01

## 2025-01-27 RX ORDER — OMEPRAZOLE 20 MG/1
1 CAPSULE CAPSULE, DELAYED RELEASE ORAL ONCE A DAY
Qty: 90 CAPSULES | Refills: 3

## 2025-01-27 RX ORDER — MONTELUKAST SODIUM 10 MG/1
TAKE 1 TABLET (10 MG) BY ORAL ROUTE ONCE DAILY IN THE EVENING TABLET, FILM COATED ORAL 1
Qty: 0 | Refills: 0 | Status: ACTIVE | COMMUNITY
Start: 1900-01-01

## 2025-01-27 RX ORDER — FERROUS SULFATE 325(65) MG
1 TABLET TABLET ORAL
Status: ACTIVE | COMMUNITY

## 2025-01-27 RX ORDER — OMEPRAZOLE 20 MG/1
CAPSULE, DELAYED RELEASE ORAL
Qty: 90 CAPSULE | Status: ACTIVE | COMMUNITY

## 2025-01-27 RX ORDER — FAMOTIDINE 40 MG/1
1 TABLET AT BEDTIME TABLET, FILM COATED ORAL ONCE A DAY
Qty: 90 TABLET | Refills: 3 | Status: ACTIVE | COMMUNITY
Start: 2024-01-08

## 2025-01-27 RX ORDER — HYOSCYAMINE SULFATE 0.12 MG/1
1 TABLET UNDER THE TONGUE AND ALLOW TO DISSOLVE AS NEEDED TABLET SUBLINGUAL
Qty: 180 | Refills: 3 | Status: ACTIVE | COMMUNITY
Start: 2020-06-18 | End: 2025-07-03

## 2025-01-27 RX ORDER — FAMOTIDINE 40 MG/1
1 TABLET AT BEDTIME TABLET, FILM COATED ORAL ONCE A DAY
Qty: 90 TABLET | Refills: 3
Start: 2024-01-08

## 2025-01-27 NOTE — HPI-TODAY'S VISIT:
2/24/2020 Suzanne presents for evaluation of diarrhea and c. diff. She developed diarrhea new years day. Saw her PCP that week with stools that were negative and treated with cipro x 7 days. She developed recurrent diarrhea after her cipro treatment and tested positive for c.diff. She completed 2 weeks of vanc. She is still having loose/urgent stools 2-3 times a day but no mucous or foul smell. She is on florastor daily. She denies fever or chills. 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 Since her last visit her labs are normal. Her colonoscopy was normal. She states the colestipol helps significantly but causes constipation daily. She does find it hard to take as needed with the medication interaction. She would like to have someting on hand for bad days. SHe notices dairy is a big trigger to her diarrhea. Today she is doing well otherwise. MB  11/12/2020 Suzanne presents for follow up of IBS D. She is doing great on florastor BID and levsin or immodium as needed for triggers. She thinks the colestipol TIW seems to help. She is doing well otherwise. MB 1/6/2023 Suzanne presents for evaluation of reflux.  Earlier this year she developed a flare, she was placed on Pepcid 20 mg twice daily with no relief, Dr. Stern then added omeprazole 40 mg daily and this helped for 4 weeks and then she had a flare, he added famotidine at night with improvement.  Her reflux has been better controlled however she has been unable to wean off.  She is on Celebrex and does take a regular strength aspirin.  She has never had an upper endoscopy in the past.  Today she agrees to pursue endoscopic evaluation of her symptoms, we have also discussed nutritional changes related to reflux along with weight loss.  We will follow-up pending endoscopy results.  MB 7/27/2023 Alyson returns to clinic after her EGD with Dr. Katz, pathology revealed chronic gastritis only.  She states the current cocktail arranged by Sherrie has left her with the best symptom coverage she is ever felt.  She continues on omeprazole 40 mg daily in the morning with Pepcid 20 mg before bedtime.  She only has occasional breakthrough.  She is working on dietary management.  She is down to 1 cup of coffee and 1 Diet Coke daily.  She is sleeping with pillows to elevate her head.  We talked about fully elevating the head of bed to allow for full upper body incline.  We discussed weight loss.  She will return in 4 months to review attempts at weaning versus possible reflux monitor with Sherrie. No C.Diff flares, continues florastor BID.  She stopped colestipol, continues with Levsin for flares which she finds a "live saver" . Trigger food is likely dairy- cheese and pizza. She starts back to teaching next week, has a small weekend trip to [place] with her daughter who just recently had spinal fusion for scoliosis. SP 1/8/2024 Alyson presents for follow-up of reflux.  Since her last visit she is doing well on omeprazole 40 mg in the morning and famotidine in the evening.  She agrees to wean her PPI.  Her Levsin as needed helps.  Today she denies any new GI complaints.  MB 7/8/2024 Alyson presents for follow-up.  She is status post knee replacement with constipation.  Today we have discussed the bowel regimen.  Her reflux is much improved on omeprazole 20 the morning and famotidine in the evening.  She is having take NSAIDs postop but tolerating these well.  MB 1/27/2025 Alyson presents for follow-up.  She is doing great in regard to her reflux, she is on omeprazole 20 the morning and famotidine 40 at night.  Her bowels have normalized, she is off the MiraLAX daily and just taking psyllium.  She is on Florastor twice daily and she will continue this with her history of C. difficile.  Today she is doing great from a GI standpoint.  MB

## 2025-03-18 ENCOUNTER — P2P PATIENT RECORD (OUTPATIENT)
Age: 50
End: 2025-03-18